# Patient Record
Sex: FEMALE | Race: OTHER | HISPANIC OR LATINO | URBAN - METROPOLITAN AREA
[De-identification: names, ages, dates, MRNs, and addresses within clinical notes are randomized per-mention and may not be internally consistent; named-entity substitution may affect disease eponyms.]

---

## 2021-04-30 ENCOUNTER — INPATIENT (INPATIENT)
Facility: HOSPITAL | Age: 41
LOS: 1 days | Discharge: HOME | End: 2021-05-02
Attending: HOSPITALIST | Admitting: HOSPITALIST
Payer: MEDICAID

## 2021-04-30 VITALS
DIASTOLIC BLOOD PRESSURE: 90 MMHG | RESPIRATION RATE: 30 BRPM | HEART RATE: 150 BPM | SYSTOLIC BLOOD PRESSURE: 141 MMHG | OXYGEN SATURATION: 100 % | TEMPERATURE: 101 F

## 2021-04-30 DIAGNOSIS — Z98.89 OTHER SPECIFIED POSTPROCEDURAL STATES: Chronic | ICD-10-CM

## 2021-04-30 PROCEDURE — 99291 CRITICAL CARE FIRST HOUR: CPT | Mod: 25

## 2021-04-30 PROCEDURE — 93010 ELECTROCARDIOGRAM REPORT: CPT

## 2021-04-30 NOTE — ED ADULT TRIAGE NOTE - CHIEF COMPLAINT QUOTE
Pt comes to ED c/o worsening shortness of breath & coughing starting last night. Appears extremely uncomfortable, cannot sit still, endorses severe pain in her back. Hx of asthma exacerbations. States she has been using an inhaler at home with no relief. Febrile in triage 101.5.

## 2021-05-01 DIAGNOSIS — J45.909 UNSPECIFIED ASTHMA, UNCOMPLICATED: ICD-10-CM

## 2021-05-01 DIAGNOSIS — J45.901 UNSPECIFIED ASTHMA WITH (ACUTE) EXACERBATION: ICD-10-CM

## 2021-05-01 DIAGNOSIS — R65.10 SYSTEMIC INFLAMMATORY RESPONSE SYNDROME (SIRS) OF NON-INFECTIOUS ORIGIN WITHOUT ACUTE ORGAN DYSFUNCTION: ICD-10-CM

## 2021-05-01 DIAGNOSIS — M54.9 DORSALGIA, UNSPECIFIED: ICD-10-CM

## 2021-05-01 DIAGNOSIS — Z29.9 ENCOUNTER FOR PROPHYLACTIC MEASURES, UNSPECIFIED: ICD-10-CM

## 2021-05-01 LAB
ALBUMIN SERPL ELPH-MCNC: 4.5 G/DL — SIGNIFICANT CHANGE UP (ref 3.3–5)
ALP SERPL-CCNC: 103 U/L — SIGNIFICANT CHANGE UP (ref 40–120)
ALT FLD-CCNC: 16 U/L — SIGNIFICANT CHANGE UP (ref 4–33)
ANION GAP SERPL CALC-SCNC: 15 MMOL/L — HIGH (ref 7–14)
APPEARANCE UR: CLEAR — SIGNIFICANT CHANGE UP
APTT BLD: 31.8 SEC — SIGNIFICANT CHANGE UP (ref 27–36.3)
AST SERPL-CCNC: 18 U/L — SIGNIFICANT CHANGE UP (ref 4–32)
B PERT DNA SPEC QL NAA+PROBE: SIGNIFICANT CHANGE UP
BASOPHILS # BLD AUTO: 0.04 K/UL — SIGNIFICANT CHANGE UP (ref 0–0.2)
BASOPHILS NFR BLD AUTO: 0.3 % — SIGNIFICANT CHANGE UP (ref 0–2)
BILIRUB SERPL-MCNC: <0.2 MG/DL — SIGNIFICANT CHANGE UP (ref 0.2–1.2)
BILIRUB UR-MCNC: NEGATIVE — SIGNIFICANT CHANGE UP
BLOOD GAS VENOUS COMPREHENSIVE RESULT: SIGNIFICANT CHANGE UP
BUN SERPL-MCNC: 8 MG/DL — SIGNIFICANT CHANGE UP (ref 7–23)
C PNEUM DNA SPEC QL NAA+PROBE: SIGNIFICANT CHANGE UP
CALCIUM SERPL-MCNC: 9.2 MG/DL — SIGNIFICANT CHANGE UP (ref 8.4–10.5)
CHLORIDE SERPL-SCNC: 101 MMOL/L — SIGNIFICANT CHANGE UP (ref 98–107)
CK SERPL-CCNC: 245 U/L — HIGH (ref 25–170)
CO2 SERPL-SCNC: 21 MMOL/L — LOW (ref 22–31)
COLOR SPEC: COLORLESS — SIGNIFICANT CHANGE UP
CREAT SERPL-MCNC: 0.73 MG/DL — SIGNIFICANT CHANGE UP (ref 0.5–1.3)
CRP SERPL-MCNC: 30.3 MG/L — HIGH
D DIMER BLD IA.RAPID-MCNC: <150 NG/ML DDU — SIGNIFICANT CHANGE UP
DIFF PNL FLD: ABNORMAL
EOSINOPHIL # BLD AUTO: 0.1 K/UL — SIGNIFICANT CHANGE UP (ref 0–0.5)
EOSINOPHIL NFR BLD AUTO: 0.7 % — SIGNIFICANT CHANGE UP (ref 0–6)
FERRITIN SERPL-MCNC: 38 NG/ML — SIGNIFICANT CHANGE UP (ref 15–150)
FLUAV SUBTYP SPEC NAA+PROBE: SIGNIFICANT CHANGE UP
FLUBV RNA SPEC QL NAA+PROBE: SIGNIFICANT CHANGE UP
GLUCOSE SERPL-MCNC: 130 MG/DL — HIGH (ref 70–99)
GLUCOSE UR QL: ABNORMAL
HADV DNA SPEC QL NAA+PROBE: SIGNIFICANT CHANGE UP
HCG SERPL-ACNC: <5 MIU/ML — SIGNIFICANT CHANGE UP
HCOV 229E RNA SPEC QL NAA+PROBE: SIGNIFICANT CHANGE UP
HCOV HKU1 RNA SPEC QL NAA+PROBE: SIGNIFICANT CHANGE UP
HCOV NL63 RNA SPEC QL NAA+PROBE: SIGNIFICANT CHANGE UP
HCOV OC43 RNA SPEC QL NAA+PROBE: SIGNIFICANT CHANGE UP
HCT VFR BLD CALC: 43.7 % — SIGNIFICANT CHANGE UP (ref 34.5–45)
HGB BLD-MCNC: 14.3 G/DL — SIGNIFICANT CHANGE UP (ref 11.5–15.5)
HMPV RNA SPEC QL NAA+PROBE: SIGNIFICANT CHANGE UP
HPIV1 RNA SPEC QL NAA+PROBE: SIGNIFICANT CHANGE UP
HPIV2 RNA SPEC QL NAA+PROBE: SIGNIFICANT CHANGE UP
HPIV3 RNA SPEC QL NAA+PROBE: SIGNIFICANT CHANGE UP
HPIV4 RNA SPEC QL NAA+PROBE: SIGNIFICANT CHANGE UP
IANC: 12.24 K/UL — HIGH (ref 1.5–8.5)
IMM GRANULOCYTES NFR BLD AUTO: 0.3 % — SIGNIFICANT CHANGE UP (ref 0–1.5)
INR BLD: 1.07 RATIO — SIGNIFICANT CHANGE UP (ref 0.88–1.16)
KETONES UR-MCNC: NEGATIVE — SIGNIFICANT CHANGE UP
LEUKOCYTE ESTERASE UR-ACNC: NEGATIVE — SIGNIFICANT CHANGE UP
LYMPHOCYTES # BLD AUTO: 0.98 K/UL — LOW (ref 1–3.3)
LYMPHOCYTES # BLD AUTO: 6.8 % — LOW (ref 13–44)
MCHC RBC-ENTMCNC: 29.2 PG — SIGNIFICANT CHANGE UP (ref 27–34)
MCHC RBC-ENTMCNC: 32.7 GM/DL — SIGNIFICANT CHANGE UP (ref 32–36)
MCV RBC AUTO: 89.4 FL — SIGNIFICANT CHANGE UP (ref 80–100)
MONOCYTES # BLD AUTO: 1.07 K/UL — HIGH (ref 0–0.9)
MONOCYTES NFR BLD AUTO: 7.4 % — SIGNIFICANT CHANGE UP (ref 2–14)
NEUTROPHILS # BLD AUTO: 12.24 K/UL — HIGH (ref 1.8–7.4)
NEUTROPHILS NFR BLD AUTO: 84.5 % — HIGH (ref 43–77)
NITRITE UR-MCNC: NEGATIVE — SIGNIFICANT CHANGE UP
NRBC # BLD: 0 /100 WBCS — SIGNIFICANT CHANGE UP
NRBC # FLD: 0 K/UL — SIGNIFICANT CHANGE UP
NT-PROBNP SERPL-SCNC: 122 PG/ML — SIGNIFICANT CHANGE UP
PH UR: 6.5 — SIGNIFICANT CHANGE UP (ref 5–8)
PLATELET # BLD AUTO: 243 K/UL — SIGNIFICANT CHANGE UP (ref 150–400)
POTASSIUM SERPL-MCNC: 3.6 MMOL/L — SIGNIFICANT CHANGE UP (ref 3.5–5.3)
POTASSIUM SERPL-SCNC: 3.6 MMOL/L — SIGNIFICANT CHANGE UP (ref 3.5–5.3)
PROCALCITONIN SERPL-MCNC: 0.06 NG/ML — SIGNIFICANT CHANGE UP (ref 0.02–0.1)
PROT SERPL-MCNC: 8.2 G/DL — SIGNIFICANT CHANGE UP (ref 6–8.3)
PROT UR-MCNC: NEGATIVE — SIGNIFICANT CHANGE UP
PROTHROM AB SERPL-ACNC: 12.2 SEC — SIGNIFICANT CHANGE UP (ref 10.6–13.6)
RAPID RVP RESULT: DETECTED
RBC # BLD: 4.89 M/UL — SIGNIFICANT CHANGE UP (ref 3.8–5.2)
RBC # FLD: 13.3 % — SIGNIFICANT CHANGE UP (ref 10.3–14.5)
RSV RNA SPEC QL NAA+PROBE: SIGNIFICANT CHANGE UP
RV+EV RNA SPEC QL NAA+PROBE: DETECTED
SARS-COV-2 RNA SPEC QL NAA+PROBE: SIGNIFICANT CHANGE UP
SARS-COV-2 RNA SPEC QL NAA+PROBE: SIGNIFICANT CHANGE UP
SODIUM SERPL-SCNC: 137 MMOL/L — SIGNIFICANT CHANGE UP (ref 135–145)
SP GR SPEC: 1.01 — LOW (ref 1.01–1.02)
TROPONIN T, HIGH SENSITIVITY RESULT: <6 NG/L — SIGNIFICANT CHANGE UP
UROBILINOGEN FLD QL: SIGNIFICANT CHANGE UP
WBC # BLD: 14.48 K/UL — HIGH (ref 3.8–10.5)
WBC # FLD AUTO: 14.48 K/UL — HIGH (ref 3.8–10.5)

## 2021-05-01 PROCEDURE — 71045 X-RAY EXAM CHEST 1 VIEW: CPT | Mod: 26

## 2021-05-01 PROCEDURE — 99223 1ST HOSP IP/OBS HIGH 75: CPT

## 2021-05-01 RX ORDER — ALBUTEROL 90 UG/1
6 AEROSOL, METERED ORAL ONCE
Refills: 0 | Status: DISCONTINUED | OUTPATIENT
Start: 2021-05-01 | End: 2021-05-01

## 2021-05-01 RX ORDER — ACETAMINOPHEN 500 MG
650 TABLET ORAL EVERY 6 HOURS
Refills: 0 | Status: DISCONTINUED | OUTPATIENT
Start: 2021-05-01 | End: 2021-05-02

## 2021-05-01 RX ORDER — IPRATROPIUM/ALBUTEROL SULFATE 18-103MCG
3 AEROSOL WITH ADAPTER (GRAM) INHALATION ONCE
Refills: 0 | Status: COMPLETED | OUTPATIENT
Start: 2021-05-01 | End: 2021-05-01

## 2021-05-01 RX ORDER — SODIUM CHLORIDE 9 MG/ML
2000 INJECTION, SOLUTION INTRAVENOUS ONCE
Refills: 0 | Status: COMPLETED | OUTPATIENT
Start: 2021-05-01 | End: 2021-05-01

## 2021-05-01 RX ORDER — MORPHINE SULFATE 50 MG/1
4 CAPSULE, EXTENDED RELEASE ORAL ONCE
Refills: 0 | Status: DISCONTINUED | OUTPATIENT
Start: 2021-05-01 | End: 2021-05-01

## 2021-05-01 RX ORDER — MAGNESIUM SULFATE 500 MG/ML
2 VIAL (ML) INJECTION ONCE
Refills: 0 | Status: COMPLETED | OUTPATIENT
Start: 2021-05-01 | End: 2021-05-01

## 2021-05-01 RX ORDER — ACETAMINOPHEN 500 MG
650 TABLET ORAL ONCE
Refills: 0 | Status: DISCONTINUED | OUTPATIENT
Start: 2021-05-01 | End: 2021-05-01

## 2021-05-01 RX ORDER — LORATADINE 10 MG/1
10 TABLET ORAL DAILY
Refills: 0 | Status: DISCONTINUED | OUTPATIENT
Start: 2021-05-01 | End: 2021-05-02

## 2021-05-01 RX ORDER — KETOROLAC TROMETHAMINE 30 MG/ML
15 SYRINGE (ML) INJECTION ONCE
Refills: 0 | Status: DISCONTINUED | OUTPATIENT
Start: 2021-05-01 | End: 2021-05-01

## 2021-05-01 RX ORDER — ACETAMINOPHEN 500 MG
1000 TABLET ORAL ONCE
Refills: 0 | Status: COMPLETED | OUTPATIENT
Start: 2021-05-01 | End: 2021-05-01

## 2021-05-01 RX ORDER — IPRATROPIUM BROMIDE 0.2 MG/ML
1 SOLUTION, NON-ORAL INHALATION ONCE
Refills: 0 | Status: DISCONTINUED | OUTPATIENT
Start: 2021-05-01 | End: 2021-05-01

## 2021-05-01 RX ORDER — IPRATROPIUM/ALBUTEROL SULFATE 18-103MCG
3 AEROSOL WITH ADAPTER (GRAM) INHALATION EVERY 4 HOURS
Refills: 0 | Status: DISCONTINUED | OUTPATIENT
Start: 2021-05-01 | End: 2021-05-02

## 2021-05-01 RX ORDER — KETOROLAC TROMETHAMINE 30 MG/ML
30 SYRINGE (ML) INJECTION EVERY 6 HOURS
Refills: 0 | Status: DISCONTINUED | OUTPATIENT
Start: 2021-05-01 | End: 2021-05-02

## 2021-05-01 RX ADMIN — Medication 3 MILLILITER(S): at 00:55

## 2021-05-01 RX ADMIN — SODIUM CHLORIDE 2000 MILLILITER(S): 9 INJECTION, SOLUTION INTRAVENOUS at 04:22

## 2021-05-01 RX ADMIN — Medication 400 MILLIGRAM(S): at 00:27

## 2021-05-01 RX ADMIN — Medication 3 MILLILITER(S): at 09:46

## 2021-05-01 RX ADMIN — Medication 1000 MILLIGRAM(S): at 00:45

## 2021-05-01 RX ADMIN — Medication 3 MILLILITER(S): at 17:14

## 2021-05-01 RX ADMIN — Medication 650 MILLIGRAM(S): at 19:18

## 2021-05-01 RX ADMIN — Medication 40 MILLIGRAM(S): at 09:42

## 2021-05-01 RX ADMIN — LORATADINE 10 MILLIGRAM(S): 10 TABLET ORAL at 14:21

## 2021-05-01 RX ADMIN — Medication 1000 MILLIGRAM(S): at 04:22

## 2021-05-01 RX ADMIN — Medication 3 MILLILITER(S): at 04:42

## 2021-05-01 RX ADMIN — Medication 125 MILLIGRAM(S): at 00:27

## 2021-05-01 RX ADMIN — Medication 650 MILLIGRAM(S): at 09:40

## 2021-05-01 RX ADMIN — Medication 3 MILLILITER(S): at 13:18

## 2021-05-01 RX ADMIN — Medication 50 GRAM(S): at 00:16

## 2021-05-01 RX ADMIN — MORPHINE SULFATE 4 MILLIGRAM(S): 50 CAPSULE, EXTENDED RELEASE ORAL at 00:54

## 2021-05-01 RX ADMIN — Medication 3 MILLILITER(S): at 01:38

## 2021-05-01 RX ADMIN — Medication 2 GRAM(S): at 04:22

## 2021-05-01 RX ADMIN — Medication 3 MILLILITER(S): at 21:23

## 2021-05-01 RX ADMIN — MORPHINE SULFATE 4 MILLIGRAM(S): 50 CAPSULE, EXTENDED RELEASE ORAL at 04:22

## 2021-05-01 RX ADMIN — Medication 3 MILLILITER(S): at 02:13

## 2021-05-01 RX ADMIN — Medication 650 MILLIGRAM(S): at 10:10

## 2021-05-01 RX ADMIN — Medication 650 MILLIGRAM(S): at 19:50

## 2021-05-01 RX ADMIN — SODIUM CHLORIDE 2000 MILLILITER(S): 9 INJECTION, SOLUTION INTRAVENOUS at 01:41

## 2021-05-01 NOTE — H&P ADULT - NSHPREVIEWOFSYSTEMS_GEN_ALL_CORE
CONSTITUTIONAL:  No weight loss, fever, chills, weakness or fatigue.  HEENT:  Eyes:  No visual loss, blurred vision, double vision or yellow sclerae. Ears, Nose, Throat:  No hearing loss, sneezing, congestion, runny nose or sore throat.  SKIN:  No rash or itching.  CARDIOVASCULAR:  +chest tightness No chest pain, chest pressure or chest discomfort. No palpitations or edema.  RESPIRATORY: +SOB +cough No sputum.  GASTROINTESTINAL:  No anorexia, nausea, vomiting or diarrhea. No abdominal pain or blood.  GENITOURINARY:  Denies hematuria, dysuria.   NEUROLOGICAL:  No headache, dizziness, syncope, paralysis, ataxia, numbness or tingling in the extremities. No change in bowel or bladder control.  MUSCULOSKELETAL: +back pain No muscle, joint pain or stiffness.  HEMATOLOGIC:  No anemia, bleeding or bruising.  LYMPHATICS:  No enlarged nodes. No history of splenectomy.  PSYCHIATRIC:  No history of depression or anxiety.  ENDOCRINOLOGIC:  No reports of sweating, cold or heat intolerance. No polyuria or polydipsia.

## 2021-05-01 NOTE — H&P ADULT - NSHPPHYSICALEXAM_GEN_ALL_CORE
GENERAL APPEARANCE: Well developed, well nourished, alert and cooperative. In moderate respiratory distress  HEENT:  PERRL, EOMI. External auditory canals normal, hearing grossly intact.  NECK: Neck supple, non-tender without lymphadenopathy, masses or thyromegaly.  CARDIAC: Normal S1 and S2. No S3, S4 or murmurs. Rhythm is regular.  LUNGS: Tachypneic, no accessory muscle use on exam. Diffuse expiratory wheeze.  ABDOMEN: Positive bowel sounds. Soft, nondistended, nontender. No guarding or rebound.   MUSCULOSKELETAL: ROM intact spine and extremities. No joint erythema or tenderness.   BACK: normal posture, lumbar paraspinal tenderness, no midline tenderness   EXTREMITIES: No significant deformity or joint abnormality. No edema. Peripheral pulses intact. No varicosities.  NEUROLOGICAL: CN II-XII intact. Strength and sensation symmetric and intact throughout.   SKIN: Skin normal color, texture and turgor with no lesions or eruptions.  PSYCHIATRIC: AOx3.Normal affect and behavior.

## 2021-05-01 NOTE — ED ADULT NURSE REASSESSMENT NOTE - NS ED NURSE REASSESS COMMENT FT1
Pt A&Ox4. Pt resting comfortably in bed. Pt removed bipap, pt endorsing relief after taking bipap off. Pt given duoneb as ordered. Pt currently sating 100% on room air, respirations 20/min. Cardiac monitor in place, NSR noted. Denies chest pain, palpitations, SOB, headaches, dizziness. Vital signs as noted, comfort measures provided, call bell within reach. Assessment ongoing.

## 2021-05-01 NOTE — ED PROVIDER NOTE - PHYSICAL EXAMINATION
Vitals: I have reviewed the patients vital signs. Tachycardic, tachypneic  General: uncomfortable appearing, in obvious resp distress, coughing, unable to get comfortable in bed  HEENT: atraumatic, normocephalic, airway patent, EOMI and appropriate tracking  Neck: no JVD, no tracheal deviation  Chest/Lungs: symmetric chest rise, lung sounds bilateral wheezing, obvious WOB, tachypnea   Heart: tachy rate, regular rhythm, skin well perfused  Neuro: A+Ox3, non dysarthric speech, moving all extremities  Eyes: EOMI, no conjunctival injection  MSK: all limbs at baseline strength, no wasting or atrophy,   Skin: no bleeding, no cyanosis, no jaundice, no new emergent lesions

## 2021-05-01 NOTE — ED PROVIDER NOTE - NS ED ROS FT
Constitutional: (+) fever (-) vomiting  Eyes/ENT: (-) vision changes, (-) hearing chnages  Cardiovascular: (-) chest pain, (-) wheezing  Respiratory: (+) cough, (+) shortness of breath  Gastrointestinal: (-) vomiting, (-) diarrhea, (-) abdominal pain  : (-) dysuria   Musculoskeletal: (+) back pain  Integumentary: (-) rash, (-) edema  Neurological: (-)loc  Allergic/Immunologic: (-) pruritus  Endocrine: No history of thyroid disease

## 2021-05-01 NOTE — H&P ADULT - PROBLEM SELECTOR PLAN 1
-Pt p/w tachypnea with SOB, diffuse wheezing on exam. Likely acute asthma exacerbation   -Pro-bnp wnl. D dimer neg. EKG non ischemic, trops <6  -Pt treated with IV solumedrol, multiple rounds of nebs and magnesium with improvement   -trialed on bipap however did not tolerate, mointor off for now  -c/w prednisone 40 mg  -no evidence of bacterial infection, holding off on abx. Check RVP  -c/w nebs q4h  -continuous pulse ox

## 2021-05-01 NOTE — ED ADULT NURSE REASSESSMENT NOTE - NS ED NURSE REASSESS COMMENT FT1
Pt A&Ox4. Pt sitting up in bed at this time. Pt on bipap, pt currently breathing approximately 35/minute, sating 100%. Pt actively coughing at this time. Pt complaining of slight back pain at this time. Respirations equal and labored at this time. Cardiac monitor in place, NSR noted. Vital signs as noted, comfort measures provided, call bell within reach.

## 2021-05-01 NOTE — CHART NOTE - NSCHARTNOTEFT_GEN_A_CORE
Pt seen/examined at bedside.  for full note see h and p from earlier this AM.    40 F with PMH asthma, generally well-controlled, a/w asthma exacerbation.  Unclear etiology.  Started claritin for ? allergic trigger, will check RVP to r.o resp virus (covid -), cxr clear.  mild leukocytosis, febrile in ED.  c/w nebs. steroids, pt still with b/l wheezing on exam.

## 2021-05-01 NOTE — H&P ADULT - NSHPLABSRESULTS_GEN_ALL_CORE
(05-01 @ 00:38)                      14.3  14.48 )-----------( 243                 43.7    Neutrophils = 12.24 (84.5%)  Lymphocytes = 0.98 (6.8%)  Eosinophils = 0.10 (0.7%)  Basophils = 0.04 (0.3%)  Monocytes = 1.07 (7.4%)  Bands = --%    05-01    137  |  101  |  8   ----------------------------<  130<H>  3.6   |  21<L>  |  0.73    Ca    9.2      01 May 2021 00:38    TPro  8.2  /  Alb  4.5  /  TBili  <0.2  /  DBili  x   /  AST  18  /  ALT  16  /  AlkPhos  103  05-01    ( 01 May 2021 00:38 )   PT: 12.2 sec;   INR: 1.07 ratio;       PTT:31.8 sec        Venous Blood Gas:  05-01 @ 04:42  7.43/32/134/23/99.1  VBG Lactate: 1.4      < from: Xray Chest 1 View- PORTABLE-Urgent (05.01.21 @ 00:12) >      INTERPRETATION:  Clear lungs.    < end of copied text >      Labs and imaging reviewed  EKG: Sinus tachy, no acute ST changes

## 2021-05-01 NOTE — ED PROVIDER NOTE - ATTENDING CONTRIBUTION TO CARE
39 y/o F with PMH asthma p/w fever, cough and sob. Pt reports worsening shortness of breath & coughing starting last night. tachypneic w/ chest pain in the back. pt states she has had increased WOB and trouble catching her breath. She is tachycardic and tachypneic, ekg sinus. She states she tried albuterol without improvement. She is saturating 100% on room air w/ cough. Tmax 102. She denies recent travel. no fever, no chills.   denies +fever, chills, +chest pain, +SOB, abdominal pain, diarrhea, dysuria, syncope, bleeding, new rash, weakness, numbness, blurred vision  + cough   ROS  otherwise negative as per HPI  Gen: Awake, Alert, WD, WN, NAD  Head:  NC/AT  Eyes:  PERRL, EOMI, Conjunctiva pink, lids normal, no scleral icterus  ENT: OP clear, no exudates, no erythema, uvula midline, TMs clear bilaterally, moist mucus membranes  Neck: supple, nontender, no meningismus, no JVD, trachea midline  Cardiac/CV:  S1 S2, tachycardia  no M/G/R  Respiratory/Pulm:  wheezing,   Gastrointestinal/Abdomen:  Soft, nontender, nondistended, +BS, no rebound/guarding  Back:  no CVAT, no MLT  Ext:  warm, well perfused, moving all extremities spontaneously, no peripheral edema, distal pulses intact  Skin: intact, no rash, ? sun burn   Neuro:  AAOx3, sensation intact, motor 5/5 x 4 extremities, normal gait, speech clear

## 2021-05-01 NOTE — H&P ADULT - ASSESSMENT
41 y/o F hx asthma (never intubated) p/w hypoxic respiratory failure 2/2 acute asthma exacerbation c/b fever of unknown etiology  41 y/o F hx asthma (never intubated) p/w worsening SOB, cough and wheezing 2/2 acute asthma exacerbation c/b fever of unknown etiology

## 2021-05-01 NOTE — H&P ADULT - PROBLEM SELECTOR PLAN 2
-Pt w/ t max 101, leukocytosis   -with dry cough however CXR clear, no signs of bacterial infection  -procalc neg. Inflammatory markers all negative making COVID unlikely   -UA pending especially in the setting of back pain  -check RVP  -Draw blood cultures if repeat fever spike   -Monitor off abx for now

## 2021-05-01 NOTE — H&P ADULT - HISTORY OF PRESENT ILLNESS
41 y/o F hx asthma (never intubated) p/w worsening shortness of breath, cough and wheezing x 2 days. Pt reports two nights ago she noted shortness of breath and cough worse at night. She took her son's inhalers and nebulizers with minimal relief. Yesterday while out watching her son's game, she her SOB acutely worsened w/ chest tightness. Felt dyspneic with exertion. She reports forceful dry cough w/ resulting lower back pain. Back pain is paraspinal without radiation. On triage pt found to have fevers to 101, pt unaware of fever to arrival. Denies travel, sick contacts. Denies abd pain, dysuria, chest pain, N/V or LE edema. States she has not had an asthma exacerbation for years, believes her asthma is likely triggered by seasonal allergies.

## 2021-05-01 NOTE — ED PROVIDER NOTE - PROGRESS NOTE DETAILS
Karthik PGY-3:  Pt with improved air movement s/p 1 duoneb, will administer 2 additional duonebs and reassess for dispo Win: pt continues to wheeze, RR in high 30s, respiratory called, will start on bipap Win: pt did not tolerate bipap, taken off after 5 mins, states it made her feel worse. Given additional neb. Admit to med

## 2021-05-01 NOTE — ED ADULT NURSE NOTE - OBJECTIVE STATEMENT
Pt received to room 8. Pt A&Ox4, ambulatory with steady gait observed. Pt complaining of SOB, fevers and cough since yesterday. Pt states she took her nebulizer treatment at home without relief. Respirations equal and labored at time of arrival, pt sating 100% on room air, audible wheezing heard with inspiration. Pt endorsing pain to the lower back. Denies chest pain, palpitations, SOB, N/V/D, headaches, dizziness, recent sick contacts. Cardiac monitor in place, NSR noted. Vital signs as noted, comfort measures provided, call bell within reach. Assessment ongoing.

## 2021-05-01 NOTE — ED PROVIDER NOTE - OBJECTIVE STATEMENT
39 y/o F hx asthma, here with fever, sob since yesterday at 8pm. Tried home nebs without relief. Arrived visibly uncomfortable, complaining of intense low back pain of unclear etiology. States she has never been intubated before. Denies travel, sick contacts, was unaware of fever prior to arrival. Denies abd pain, vaginal bleeding, syncope.

## 2021-05-01 NOTE — H&P ADULT - PROBLEM SELECTOR PLAN 3
-likely MSK strain in the setting of cough. No alarming signs on exam  -check CK  -rule out infection, check UA  -c/w pain control

## 2021-05-01 NOTE — ED PROVIDER NOTE - CLINICAL SUMMARY MEDICAL DECISION MAKING FREE TEXT BOX
41 y/o asthmatic here with low back pain, sob, fever, since last night. Has not had asthma attack in some time, but states it is similar to previous attacks. Tried nebs without relief. Acutely tachycardic, tachypneic. Brought back to room, given stat albuterol and mag. Concern for acute asthma, pneumonia, covid, less likely PTX. Will get cxr, ekg, basics, nebs, mag, steroids, pain control. Reassess dc.

## 2021-05-02 VITALS
DIASTOLIC BLOOD PRESSURE: 71 MMHG | TEMPERATURE: 98 F | OXYGEN SATURATION: 99 % | RESPIRATION RATE: 18 BRPM | SYSTOLIC BLOOD PRESSURE: 112 MMHG | HEART RATE: 99 BPM

## 2021-05-02 LAB
ALBUMIN SERPL ELPH-MCNC: 3.6 G/DL — SIGNIFICANT CHANGE UP (ref 3.3–5)
ALP SERPL-CCNC: 75 U/L — SIGNIFICANT CHANGE UP (ref 40–120)
ALT FLD-CCNC: 17 U/L — SIGNIFICANT CHANGE UP (ref 4–33)
ANION GAP SERPL CALC-SCNC: 11 MMOL/L — SIGNIFICANT CHANGE UP (ref 7–14)
AST SERPL-CCNC: 15 U/L — SIGNIFICANT CHANGE UP (ref 4–32)
BILIRUB SERPL-MCNC: 0.2 MG/DL — SIGNIFICANT CHANGE UP (ref 0.2–1.2)
BUN SERPL-MCNC: 10 MG/DL — SIGNIFICANT CHANGE UP (ref 7–23)
CALCIUM SERPL-MCNC: 8.6 MG/DL — SIGNIFICANT CHANGE UP (ref 8.4–10.5)
CHLORIDE SERPL-SCNC: 106 MMOL/L — SIGNIFICANT CHANGE UP (ref 98–107)
CO2 SERPL-SCNC: 21 MMOL/L — LOW (ref 22–31)
COVID-19 SPIKE DOMAIN AB INTERP: POSITIVE
COVID-19 SPIKE DOMAIN ANTIBODY RESULT: 51 U/ML — HIGH
CREAT SERPL-MCNC: 0.69 MG/DL — SIGNIFICANT CHANGE UP (ref 0.5–1.3)
CULTURE RESULTS: SIGNIFICANT CHANGE UP
GLUCOSE SERPL-MCNC: 114 MG/DL — HIGH (ref 70–99)
HCT VFR BLD CALC: 37.9 % — SIGNIFICANT CHANGE UP (ref 34.5–45)
HGB BLD-MCNC: 12.3 G/DL — SIGNIFICANT CHANGE UP (ref 11.5–15.5)
MAGNESIUM SERPL-MCNC: 2.3 MG/DL — SIGNIFICANT CHANGE UP (ref 1.6–2.6)
MCHC RBC-ENTMCNC: 28.9 PG — SIGNIFICANT CHANGE UP (ref 27–34)
MCHC RBC-ENTMCNC: 32.5 GM/DL — SIGNIFICANT CHANGE UP (ref 32–36)
MCV RBC AUTO: 89 FL — SIGNIFICANT CHANGE UP (ref 80–100)
NRBC # BLD: 0 /100 WBCS — SIGNIFICANT CHANGE UP
NRBC # FLD: 0 K/UL — SIGNIFICANT CHANGE UP
PHOSPHATE SERPL-MCNC: 3.4 MG/DL — SIGNIFICANT CHANGE UP (ref 2.5–4.5)
PLATELET # BLD AUTO: 221 K/UL — SIGNIFICANT CHANGE UP (ref 150–400)
POTASSIUM SERPL-MCNC: 4.2 MMOL/L — SIGNIFICANT CHANGE UP (ref 3.5–5.3)
POTASSIUM SERPL-SCNC: 4.2 MMOL/L — SIGNIFICANT CHANGE UP (ref 3.5–5.3)
PROT SERPL-MCNC: 6.6 G/DL — SIGNIFICANT CHANGE UP (ref 6–8.3)
RBC # BLD: 4.26 M/UL — SIGNIFICANT CHANGE UP (ref 3.8–5.2)
RBC # FLD: 13.9 % — SIGNIFICANT CHANGE UP (ref 10.3–14.5)
SARS-COV-2 IGG+IGM SERPL QL IA: 51 U/ML — HIGH
SARS-COV-2 IGG+IGM SERPL QL IA: POSITIVE
SODIUM SERPL-SCNC: 138 MMOL/L — SIGNIFICANT CHANGE UP (ref 135–145)
SPECIMEN SOURCE: SIGNIFICANT CHANGE UP
WBC # BLD: 8.72 K/UL — SIGNIFICANT CHANGE UP (ref 3.8–10.5)
WBC # FLD AUTO: 8.72 K/UL — SIGNIFICANT CHANGE UP (ref 3.8–10.5)

## 2021-05-02 PROCEDURE — 99239 HOSP IP/OBS DSCHRG MGMT >30: CPT

## 2021-05-02 RX ORDER — IPRATROPIUM/ALBUTEROL SULFATE 18-103MCG
3 AEROSOL WITH ADAPTER (GRAM) INHALATION
Qty: 120 | Refills: 0
Start: 2021-05-02 | End: 2021-05-31

## 2021-05-02 RX ORDER — LORATADINE 10 MG/1
1 TABLET ORAL
Qty: 30 | Refills: 0
Start: 2021-05-02 | End: 2021-05-31

## 2021-05-02 RX ORDER — ALBUTEROL 90 UG/1
2 AEROSOL, METERED ORAL
Qty: 1 | Refills: 0
Start: 2021-05-02 | End: 2021-05-31

## 2021-05-02 RX ADMIN — LORATADINE 10 MILLIGRAM(S): 10 TABLET ORAL at 12:03

## 2021-05-02 RX ADMIN — Medication 3 MILLILITER(S): at 05:05

## 2021-05-02 RX ADMIN — Medication 100 MILLIGRAM(S): at 06:03

## 2021-05-02 RX ADMIN — Medication 40 MILLIGRAM(S): at 05:31

## 2021-05-02 RX ADMIN — Medication 3 MILLILITER(S): at 00:51

## 2021-05-02 RX ADMIN — Medication 3 MILLILITER(S): at 09:13

## 2021-05-02 NOTE — CHART NOTE - NSCHARTNOTEFT_GEN_A_CORE
Pt seen examined at bedside.  RVP reviewed, + entero/rhinovirus, likely cause of fever and asthma exacerbation.  Pt moving air well on exam, still with wheezing and non-productive cough but overall improved, not hypoxic.  Plan for discharge home today.  will continue with steroids x 5 days total, nebs/inhaler. Pt seen examined at bedside.  RVP reviewed, + entero/rhinovirus, likely cause of fever and asthma exacerbation.  Pt moving air well on exam, still with wheezing and non-productive cough but overall improved, not hypoxic.  Plan for discharge home today.  will continue with steroids x 5 days total, nebs/inhaler.  Pt will see PMD this week for follow-up.  dc time 30 minutes

## 2021-05-02 NOTE — DISCHARGE NOTE NURSING/CASE MANAGEMENT/SOCIAL WORK - PATIENT PORTAL LINK FT
You can access the FollowMyHealth Patient Portal offered by [facility] by registering at the following website: [email or web address]. By joining [employer]’s FollowMyHealth portal, you will also be able to view your health information using other applications (apps) compatible with our system.

## 2021-05-02 NOTE — DISCHARGE NOTE PROVIDER - NSDCCPCAREPLAN_GEN_ALL_CORE_FT
PRINCIPAL DISCHARGE DIAGNOSIS  Diagnosis: Asthma exacerbation  Assessment and Plan of Treatment: Complete course of prednisone 40mg PO daily x total of 5 days. Continue nebulizers as directed. Follow up with your primary care physician Dr. Indra Manzanares for further monitoring within 1 week. Please call to arrange appointment.      SECONDARY DISCHARGE DIAGNOSES  Diagnosis: Rhinovirus  Assessment and Plan of Treatment: Supportive care. Stay well hydrated. Ensure compliance with your treatment for Asthma as outlined above. Follow up with your primary care physician Dr. Indra Manzanares for further monitoring within 1 week. Please call to arrange appointment.

## 2021-05-02 NOTE — DISCHARGE NOTE PROVIDER - HOSPITAL COURSE
41 y/o F hx asthma (never intubated) p/w worsening shortness of breath, cough and wheezing x 2 days. Pt reports two nights ago she noted shortness of breath and cough worse at night. She took her son's inhalers and nebulizers with minimal relief. Yesterday while out watching her son's game, she her SOB acutely worsened w/ chest tightness. Felt dyspneic with exertion. She reports forceful dry cough w/ resulting lower back pain. Back pain is paraspinal without radiation. On triage Pt found to have fevers to 101, Pt unaware of fever to arrival.  ,  Hospital Course: Pt admitted with asthma exacerbation with fever, RVP + Rhino/enterovirus, COVID negative. In ED, Pt given IV solumedrol, nebs, trial on BiPAP. Pt transitioned to PO prednisone 40mg daily. CXR unremarkable. Cont pulse Ox monitoring. Wheezing improving.     Case discussed with Dr. Smith, labs/vitals reviewed, recommend to c/w Prednisone 40mg PO daily x 5 day course, c/w nebs, outpt f/u with PCP this week, Pt medically cleared for discharge to home with outpt follow up as noted. 39 y/o F hx asthma (never intubated) p/w worsening shortness of breath, cough and wheezing x 2 days. Pt reports two nights ago she noted shortness of breath and cough worse at night. She took her son's inhalers and nebulizers with minimal relief. Yesterday while out watching her son's game, she her SOB acutely worsened w/ chest tightness. Felt dyspneic with exertion. She reports forceful dry cough w/ resulting lower back pain. Back pain is paraspinal without radiation. On triage Pt found to have fevers to 101.  Pt given steroids, trialed on BiPaP which she could not tolerate.  She was admitted to medicine.  Steroids continued.  RVP + Enterovirus.  Wheezing and respiratory status improved.  Pt noted to have sepsis 2/2 enterovirus which was present on admission.  Pt to c/w Prednisone 40mg PO daily x 5 day course, c/w nebs, outpt f/u with PCP this week, Pt medically cleared for discharge to home with outpt follow up as noted.

## 2021-05-02 NOTE — DISCHARGE NOTE PROVIDER - NSDCMRMEDTOKEN_GEN_ALL_CORE_FT
Albuterol (Eqv-ProAir HFA) 90 mcg/inh inhalation aerosol: 2 puff(s) inhaled every 6 hours, As Needed -for shortness of breath and/or wheezing   ipratropium-albuterol 0.5 mg-2.5 mg/3 mLinhalation solution: 3 milliliter(s) inhaled every 6 hours, As Needed -for shortness of breath and/or wheezing   loratadine 10 mg oral tablet: 1 tab(s) orally once a day  Nebulizer: 1 unit(s) inhaled every 6 hours   predniSONE 20 mg oral tablet: 2 tab(s) orally once a day x 3 more days 5/3-5/5

## 2021-07-30 NOTE — PATIENT PROFILE ADULT - LIVING ENVIRONMENT
Paola Perry LPN     no